# Patient Record
Sex: FEMALE | Race: WHITE | NOT HISPANIC OR LATINO | URBAN - NONMETROPOLITAN AREA
[De-identification: names, ages, dates, MRNs, and addresses within clinical notes are randomized per-mention and may not be internally consistent; named-entity substitution may affect disease eponyms.]

---

## 2022-04-25 ENCOUNTER — OFFICE (OUTPATIENT)
Dept: URBAN - NONMETROPOLITAN AREA CLINIC 5 | Facility: CLINIC | Age: 67
End: 2022-04-25

## 2022-04-25 VITALS
WEIGHT: 179 LBS | HEIGHT: 62 IN | RESPIRATION RATE: 18 BRPM | SYSTOLIC BLOOD PRESSURE: 130 MMHG | HEART RATE: 93 BPM | DIASTOLIC BLOOD PRESSURE: 77 MMHG

## 2022-04-25 DIAGNOSIS — R19.4 CHANGE IN BOWEL HABIT: ICD-10-CM

## 2022-04-25 DIAGNOSIS — K21.9 GASTRO-ESOPHAGEAL REFLUX DISEASE WITHOUT ESOPHAGITIS: ICD-10-CM

## 2022-04-25 DIAGNOSIS — K92.1 MELENA: ICD-10-CM

## 2022-04-25 DIAGNOSIS — R10.32 LEFT LOWER QUADRANT PAIN: ICD-10-CM

## 2022-04-25 PROCEDURE — 99204 OFFICE O/P NEW MOD 45 MIN: CPT | Performed by: INTERNAL MEDICINE

## 2022-05-16 ENCOUNTER — AMBULATORY SURGICAL CENTER (OUTPATIENT)
Dept: URBAN - NONMETROPOLITAN AREA SURGERY 4 | Facility: SURGERY | Age: 67
End: 2022-05-16
Payer: COMMERCIAL

## 2022-05-16 ENCOUNTER — AMBULATORY SURGICAL CENTER (OUTPATIENT)
Dept: URBAN - NONMETROPOLITAN AREA SURGERY 4 | Facility: SURGERY | Age: 67
End: 2022-05-16
Payer: MEDICARE

## 2022-05-16 VITALS
HEART RATE: 75 BPM | TEMPERATURE: 97.7 F | SYSTOLIC BLOOD PRESSURE: 143 MMHG | OXYGEN SATURATION: 97 % | DIASTOLIC BLOOD PRESSURE: 40 MMHG | RESPIRATION RATE: 17 BRPM | SYSTOLIC BLOOD PRESSURE: 135 MMHG | HEIGHT: 62 IN | HEART RATE: 80 BPM | SYSTOLIC BLOOD PRESSURE: 146 MMHG | OXYGEN SATURATION: 90 % | SYSTOLIC BLOOD PRESSURE: 119 MMHG | RESPIRATION RATE: 16 BRPM | OXYGEN SATURATION: 98 % | DIASTOLIC BLOOD PRESSURE: 65 MMHG | WEIGHT: 170 LBS | OXYGEN SATURATION: 93 % | HEART RATE: 73 BPM | SYSTOLIC BLOOD PRESSURE: 127 MMHG | OXYGEN SATURATION: 93 % | HEART RATE: 77 BPM | TEMPERATURE: 97.3 F | DIASTOLIC BLOOD PRESSURE: 58 MMHG | OXYGEN SATURATION: 98 % | SYSTOLIC BLOOD PRESSURE: 106 MMHG | DIASTOLIC BLOOD PRESSURE: 72 MMHG | HEART RATE: 80 BPM | OXYGEN SATURATION: 90 % | SYSTOLIC BLOOD PRESSURE: 106 MMHG | OXYGEN SATURATION: 97 % | HEART RATE: 74 BPM | HEART RATE: 76 BPM | RESPIRATION RATE: 16 BRPM | SYSTOLIC BLOOD PRESSURE: 94 MMHG | DIASTOLIC BLOOD PRESSURE: 70 MMHG | TEMPERATURE: 97.3 F | SYSTOLIC BLOOD PRESSURE: 143 MMHG | SYSTOLIC BLOOD PRESSURE: 98 MMHG | DIASTOLIC BLOOD PRESSURE: 65 MMHG | OXYGEN SATURATION: 99 % | HEART RATE: 73 BPM | DIASTOLIC BLOOD PRESSURE: 62 MMHG | HEART RATE: 77 BPM | HEART RATE: 73 BPM | HEART RATE: 74 BPM | SYSTOLIC BLOOD PRESSURE: 114 MMHG | DIASTOLIC BLOOD PRESSURE: 70 MMHG | SYSTOLIC BLOOD PRESSURE: 84 MMHG | OXYGEN SATURATION: 100 % | SYSTOLIC BLOOD PRESSURE: 127 MMHG | TEMPERATURE: 97 F | DIASTOLIC BLOOD PRESSURE: 72 MMHG | WEIGHT: 170 LBS | HEIGHT: 62 IN | SYSTOLIC BLOOD PRESSURE: 136 MMHG | SYSTOLIC BLOOD PRESSURE: 119 MMHG | RESPIRATION RATE: 13 BRPM | TEMPERATURE: 97.3 F | SYSTOLIC BLOOD PRESSURE: 84 MMHG | SYSTOLIC BLOOD PRESSURE: 106 MMHG | RESPIRATION RATE: 14 BRPM | SYSTOLIC BLOOD PRESSURE: 114 MMHG | SYSTOLIC BLOOD PRESSURE: 119 MMHG | HEART RATE: 75 BPM | RESPIRATION RATE: 13 BRPM | HEART RATE: 79 BPM | OXYGEN SATURATION: 100 % | RESPIRATION RATE: 13 BRPM | TEMPERATURE: 97 F | RESPIRATION RATE: 17 BRPM | SYSTOLIC BLOOD PRESSURE: 143 MMHG | HEART RATE: 76 BPM | RESPIRATION RATE: 14 BRPM | SYSTOLIC BLOOD PRESSURE: 127 MMHG | DIASTOLIC BLOOD PRESSURE: 72 MMHG | SYSTOLIC BLOOD PRESSURE: 129 MMHG | RESPIRATION RATE: 16 BRPM | OXYGEN SATURATION: 99 % | DIASTOLIC BLOOD PRESSURE: 66 MMHG | DIASTOLIC BLOOD PRESSURE: 62 MMHG | DIASTOLIC BLOOD PRESSURE: 36 MMHG | DIASTOLIC BLOOD PRESSURE: 62 MMHG | RESPIRATION RATE: 15 BRPM | SYSTOLIC BLOOD PRESSURE: 84 MMHG | HEART RATE: 59 BPM | HEART RATE: 76 BPM | SYSTOLIC BLOOD PRESSURE: 98 MMHG | DIASTOLIC BLOOD PRESSURE: 58 MMHG | DIASTOLIC BLOOD PRESSURE: 70 MMHG | HEART RATE: 80 BPM | DIASTOLIC BLOOD PRESSURE: 36 MMHG | RESPIRATION RATE: 14 BRPM | HEIGHT: 62 IN | DIASTOLIC BLOOD PRESSURE: 66 MMHG | SYSTOLIC BLOOD PRESSURE: 135 MMHG | DIASTOLIC BLOOD PRESSURE: 53 MMHG | HEART RATE: 59 BPM | DIASTOLIC BLOOD PRESSURE: 36 MMHG | DIASTOLIC BLOOD PRESSURE: 53 MMHG | SYSTOLIC BLOOD PRESSURE: 94 MMHG | OXYGEN SATURATION: 97 % | DIASTOLIC BLOOD PRESSURE: 58 MMHG | TEMPERATURE: 97.7 F | SYSTOLIC BLOOD PRESSURE: 129 MMHG | DIASTOLIC BLOOD PRESSURE: 66 MMHG | HEART RATE: 59 BPM | DIASTOLIC BLOOD PRESSURE: 40 MMHG | SYSTOLIC BLOOD PRESSURE: 136 MMHG | RESPIRATION RATE: 17 BRPM | OXYGEN SATURATION: 99 % | TEMPERATURE: 97.7 F | HEART RATE: 79 BPM | HEART RATE: 79 BPM | SYSTOLIC BLOOD PRESSURE: 98 MMHG | TEMPERATURE: 97 F | RESPIRATION RATE: 15 BRPM | SYSTOLIC BLOOD PRESSURE: 146 MMHG | SYSTOLIC BLOOD PRESSURE: 136 MMHG | OXYGEN SATURATION: 100 % | HEART RATE: 77 BPM | SYSTOLIC BLOOD PRESSURE: 114 MMHG | SYSTOLIC BLOOD PRESSURE: 135 MMHG | SYSTOLIC BLOOD PRESSURE: 146 MMHG | DIASTOLIC BLOOD PRESSURE: 65 MMHG | RESPIRATION RATE: 15 BRPM | DIASTOLIC BLOOD PRESSURE: 40 MMHG | HEART RATE: 74 BPM | HEART RATE: 75 BPM | DIASTOLIC BLOOD PRESSURE: 53 MMHG | OXYGEN SATURATION: 98 % | SYSTOLIC BLOOD PRESSURE: 129 MMHG | SYSTOLIC BLOOD PRESSURE: 94 MMHG | OXYGEN SATURATION: 93 % | WEIGHT: 170 LBS | OXYGEN SATURATION: 90 %

## 2022-05-16 DIAGNOSIS — K92.1 MELENA: ICD-10-CM

## 2022-05-16 DIAGNOSIS — Z79.899 OTHER LONG TERM (CURRENT) DRUG THERAPY: ICD-10-CM

## 2022-05-16 DIAGNOSIS — R10.32 LEFT LOWER QUADRANT PAIN: ICD-10-CM

## 2022-05-16 DIAGNOSIS — R73.03 PREDIABETES: ICD-10-CM

## 2022-05-16 DIAGNOSIS — R19.4 CHANGE IN BOWEL HABIT: ICD-10-CM

## 2022-05-16 DIAGNOSIS — E66.9 OBESITY, UNSPECIFIED: ICD-10-CM

## 2022-05-16 DIAGNOSIS — K64.8 OTHER HEMORRHOIDS: ICD-10-CM

## 2022-05-16 PROCEDURE — 00811 ANES LWR INTST NDSC NOS: CPT | Mod: P3,QZ | Performed by: NURSE ANESTHETIST, CERTIFIED REGISTERED

## 2022-05-16 PROCEDURE — 45378 DIAGNOSTIC COLONOSCOPY: CPT | Performed by: INTERNAL MEDICINE

## 2022-05-16 PROCEDURE — 00811 ANES LWR INTST NDSC NOS: CPT | Mod: QZ,P3 | Performed by: NURSE ANESTHETIST, CERTIFIED REGISTERED

## 2022-08-03 ENCOUNTER — OFFICE (OUTPATIENT)
Dept: URBAN - NONMETROPOLITAN AREA CLINIC 5 | Facility: CLINIC | Age: 67
End: 2022-08-03
Payer: COMMERCIAL

## 2022-08-03 VITALS
SYSTOLIC BLOOD PRESSURE: 148 MMHG | HEIGHT: 62 IN | DIASTOLIC BLOOD PRESSURE: 83 MMHG | HEART RATE: 80 BPM | WEIGHT: 174 LBS | RESPIRATION RATE: 18 BRPM

## 2022-08-03 DIAGNOSIS — K58.9 IRRITABLE BOWEL SYNDROME WITHOUT DIARRHEA: ICD-10-CM

## 2022-08-03 DIAGNOSIS — K21.9 GASTRO-ESOPHAGEAL REFLUX DISEASE WITHOUT ESOPHAGITIS: ICD-10-CM

## 2022-08-03 DIAGNOSIS — R12 HEARTBURN: ICD-10-CM

## 2022-08-03 DIAGNOSIS — F41.9 ANXIETY DISORDER, UNSPECIFIED: ICD-10-CM

## 2022-08-03 DIAGNOSIS — K64.8 OTHER HEMORRHOIDS: ICD-10-CM

## 2022-08-03 DIAGNOSIS — K92.1 MELENA: ICD-10-CM

## 2022-08-03 PROCEDURE — 99214 OFFICE O/P EST MOD 30 MIN: CPT

## 2025-08-07 ENCOUNTER — OFFICE (OUTPATIENT)
Dept: URBAN - NONMETROPOLITAN AREA CLINIC 5 | Facility: CLINIC | Age: 70
End: 2025-08-07
Payer: MEDICARE

## 2025-08-07 VITALS
HEART RATE: 90 BPM | SYSTOLIC BLOOD PRESSURE: 119 MMHG | RESPIRATION RATE: 18 BRPM | WEIGHT: 152 LBS | HEIGHT: 62 IN | DIASTOLIC BLOOD PRESSURE: 83 MMHG

## 2025-08-07 DIAGNOSIS — R10.13 EPIGASTRIC PAIN: ICD-10-CM

## 2025-08-07 DIAGNOSIS — R14.2 ERUCTATION: ICD-10-CM

## 2025-08-07 DIAGNOSIS — R19.7 DIARRHEA, UNSPECIFIED: ICD-10-CM

## 2025-08-07 PROCEDURE — 99204 OFFICE O/P NEW MOD 45 MIN: CPT | Performed by: NURSE PRACTITIONER

## 2025-08-07 NOTE — SERVICEHPINOTES
The patient is a 70-year-old female here today for an extended visit. She has a PMH of GERD, breast cancer s/p bilateral mastectomy, hypothyroidism, HTN and s/p cholecystectomy. br
young   Colonoscopy on 05/16/2022 for hematochezia, change in bowel habits and LLQ abdominal pain revealed internal hemorrhoids otherwise normal.
br
young She has history of constipation and it improved with magnesium and Metamucil. She would occasionally have some loose stools. 
young vidal On July 12th woke up with fecal incontinence. She never had any pain, nausea or vomiting. The diarrhea would come on suddenly with no cramping. She reports fecal urgency. She started Imodium after no improvement for several days. She had stool studies and blood work and was told everything was normal. Symptoms improved last weekend and then returned. She has some abdominal pain and increased belching. The belching resolved when the diarrhea started. She is on lansoprazole 30 mg daily for acid suppression therapy. 
br
young She did start Wegovy 6 months ago and her cholesterol was lowered from 216 to 175. She stopped the shots when her diarrhea started.

## 2025-08-07 NOTE — SERVICENOTES
I was able to speak to someone from her PCP's office regarding her stool studies and she was negative for c-diff, salmonella, e-coli and campylobacter